# Patient Record
Sex: FEMALE | Race: WHITE | Employment: FULL TIME | URBAN - NONMETROPOLITAN AREA
[De-identification: names, ages, dates, MRNs, and addresses within clinical notes are randomized per-mention and may not be internally consistent; named-entity substitution may affect disease eponyms.]

---

## 2022-11-10 NOTE — PROGRESS NOTES
[facility]   Date Of Service: 11/17/2022  Provider: Afsaneh Delatorre DO, DO  Name: Josselin Bennett   MRN: [id number]    Chief Complaint(s)      Chief Complaint   Patient presents with    New Patient     Aethralgia cephalgia  +YIMI        History of Present illness (HPI)    Josselin Bennett   is a(n)57 y.o. female with a hx of recurrent cold sores, hypothyroidism, tobacco use disorder, depression, referred by Timothy Brice* for evaluation of  arthralgia, cephalgia / headaches (started 2020) , YIMI: 1:320 speckled     Arthralgia of the hand started about 2 years ago (2020) and are now constant. Persistent pain in the knees, and hips for years. Reported intermittent swelling along the thumbs and index finger. Couple week hx of numbness in the left hand with symptoms symptoms. Most severe in the morning. Aching/stiffness Pain up to 7/10 . Aggravating factors:cold, weather changes, use of hands,  hips/knees - stairs. , layingon hip (left)  Alleviating factors: mobic relief (diarrhea) Current therapy:  tylenol (mild rleief) Previous therapy:    Intermittent triggering of the fingers, mainly the 3rd finger. Am stiffness up to 60 minutes. Difficulty with buttons an duse of the hands - most severe in the mornings. -denies Photosenstivity, Rash, dry mouth/dry eyes, oral/nasal sores, Raynaud's, digital ulcerations, skin tightening, renal disease,foamy urination, hematuria, sz's, blood clots,  pre-term labor loss, AIHA,leukpenia/lymphopenia, thrombocytopenia, hair loss, serositis, enthesitis, dactylitis,  hx of STD,  personal or family history of Psoriatic arthritis, psoriasis, ank spond,       Cancer screening: not up to date on pap , up to date on mammogram and c-scope. Review of Systems    Review of Systems   Constitutional:  Positive for fatigue. Eyes: Negative. Respiratory: Negative. Cardiovascular: Negative. Gastrointestinal: Negative.     Genitourinary: Negative. PAST MEDICAL HISTORY     has no past medical history on file. PAST SURGICAL HISTORY     has a past surgical history that includes  section. FAMILY HISTORY      Family History   Problem Relation Age of Onset    Osteoporosis Mother     Arthritis Mother     Hypothyroidism Daughter     Hypothyroidism Daughter        SOCIAL HISTORY     reports that she has been smoking cigarettes. She has a 23.00 pack-year smoking history. She has never used smokeless tobacco. She reports that she does not drink alcohol and does not use drugs. ALLERGIES   No Known Allergies    CURRENT MEDICATIONS      Current Outpatient Medications:     levothyroxine (SYNTHROID) 125 MCG tablet, Take 125 mcg by mouth Daily, Disp: , Rfl:     escitalopram (LEXAPRO) 10 MG tablet, Take 10 mg by mouth daily, Disp: , Rfl:     PHYSICAL EXAMINATION / OBJECTIVE     Objective:  /82 (Site: Left Upper Arm, Position: Sitting, Cuff Size: Medium Adult)   Pulse 59   Ht 5' 4\" (1.626 m)   Wt 188 lb 6.4 oz (85.5 kg)   SpO2 95%   BMI 32.34 kg/m²     Physical Exam  Vitals reviewed. Constitutional:       Appearance: Normal appearance. HENT:      Head: Normocephalic. Nose: Nose normal. No congestion. Mouth/Throat:      Mouth: Mucous membranes are moist.   Eyes:      Comments: Conjunctival injection bilat   Cardiovascular:      Rate and Rhythm: Normal rate. Heart sounds: No murmur heard. Pulmonary:      Effort: Pulmonary effort is normal.      Breath sounds: Normal breath sounds. Musculoskeletal:         General: No swelling or tenderness. Normal range of motion. Skin:     Comments: Nail thickening and onycholysis bilat toes. Neurological:      General: No focal deficit present. Mental Status: She is alert and oriented to person, place, and time. Upper extremities:    Shoulder, elbows, wrists - Non-tender, No swelling   HANDS/FINGERS   Finkelstein test bilat.      CMCs: tender # 1 bilat,  Cms squaring bilat. , cmc grind bilat      MCPs tender # 1 bilat. Lower extremities:  HIPS tender lateral hips at the greater trochanter bilat. FADIR - causing groin pain bilat   KNEES Non-tender, + crepitus   ANKLES tender left    FEET : tender left mid foot and left mtps     Spine:   C-spine, T-spine & L-spine:  tender Right upper trap. , lumbosacral spine. Negative    shober,  pancho,  Occiput to wall , SLR/Cross SLR. LABS        CBC  No results found for: WBC, RBC, HGB, HCT, MCV, MCH, MCHC, RDW, PLT    CMP  Lab Results   Component Value Date/Time    CALCIUM 8.6 03/17/2012 07:00 AM     03/17/2012 07:00 AM    K 4.8 03/17/2012 07:00 AM    CO2 20 03/17/2012 07:00 AM     03/17/2012 07:00 AM    BUN 8 03/17/2012 07:00 AM    CREATININE 0.8 03/17/2012 07:00 AM       HgBA1c: No components found for: HGBA1C    Lab Results   Component Value Date    TSH 1.848 03/17/2012     No results found for: VITD25      No results found for: ANASCRN  No results found for: SSA  No results found for: SSB  No results found for: ANTI-SMITH  No results found for: DSDNAAB   No results found for: ANTIRNP  No results found for: C3, C4  No results found for: CCPAB  No results found for: RF    No components found for: CANCASCRN, APANCASCRN  No results found for: SEDRATE  No results found for: CRP    RADIOLOGY:       ASSESSMENT/PLAN      1. YIMI positive    2. Polyarthralgia    3. Other fatigue    4. Chronic midline low back pain without sciatica    5. Osteoarthritis of both hands, unspecified osteoarthritis type      1. YIMI positive  2. Polyarthralgia   - several year hx of hips , back and knees pain. Worse with stairs, getting up and down from seated position, prolonged inactivity. Alleviating: mobic. 2 year hx of pain mainly in the bilateral 1-2 finger. Worse with use, weather changes, reported swelling along the thumb. + fatigue, exam with cmc squaring, finkelstein test bilat.  + YIMI can be related to hashimoto's thyroiditis. - CBC with Auto Differential; Future  - Comprehensive Metabolic Panel; Future  - Sedimentation Rate; Future  - C-Reactive Protein; Future  - C4 Complement; Future  - C3 Complement; Future  - Protein / creatinine ratio, urine; Future  - Urinalysis; Future  - Miscellaneous Sendout; Future -- avise CTD panel.   - XR HAND RIGHT (MIN 3 VIEWS); Future    3. Other fatigue  -- repeat cbc, cmp, esr, crp , c3, c4, u pr/cr , u/a     4. Chronic midline low back pain without sciatica  - inflammatory and mechanical features. ? Compoenent of DJD of the hips. - XR LUMBAR SPINE (2-3 VIEWS); Future  - XR Hips Bilateral; Future    5. Osteoarthritis of both hands, unspecified osteoarthritis type   - XR HAND RIGHT (MIN 3 VIEWS); Future -  evaluation for secondary causes. - uric acid, iron studies, mag, phos. 6. De Quervain's tenosynovitis: bilat   - + Finkelstein's test and painful resisted abduction of the thumb  - Discussed trial of thumb splinting/wrist splinting initially  - Information provided provided to patient on treatment options and diagnosis  - Other treatment options discussed included hand therapy referral, referral to  orthopedic surgery, and castillo-tendon injections         Return in about 2 months (around 1/17/2023). Electronically signed by Amada Thompson DO on 11/17/2022 at 1:29 PM    New Prescriptions    No medications on file       11/17/2022       The risks and benefits of my recommendations, as well as other treatment options, benefits and side effects were discussed with the patient today. Questions were answered. Thank you for allowing me to participate in the care of this patient. Please call if there are any questions.

## 2022-11-17 ENCOUNTER — NURSE ONLY (OUTPATIENT)
Dept: LAB | Age: 57
End: 2022-11-17

## 2022-11-17 ENCOUNTER — HOSPITAL ENCOUNTER (OUTPATIENT)
Dept: GENERAL RADIOLOGY | Age: 57
Discharge: HOME | End: 2022-11-17
Payer: COMMERCIAL

## 2022-11-17 ENCOUNTER — HOSPITAL ENCOUNTER (OUTPATIENT)
Age: 57
Discharge: HOME | End: 2022-11-17
Payer: COMMERCIAL

## 2022-11-17 ENCOUNTER — OFFICE VISIT (OUTPATIENT)
Dept: RHEUMATOLOGY | Age: 57
End: 2022-11-17
Payer: COMMERCIAL

## 2022-11-17 VITALS
BODY MASS INDEX: 32.17 KG/M2 | OXYGEN SATURATION: 95 % | DIASTOLIC BLOOD PRESSURE: 82 MMHG | SYSTOLIC BLOOD PRESSURE: 116 MMHG | HEART RATE: 59 BPM | HEIGHT: 64 IN | WEIGHT: 188.4 LBS

## 2022-11-17 DIAGNOSIS — M54.50 CHRONIC MIDLINE LOW BACK PAIN WITHOUT SCIATICA: ICD-10-CM

## 2022-11-17 DIAGNOSIS — R76.8 ANA POSITIVE: ICD-10-CM

## 2022-11-17 DIAGNOSIS — M25.50 POLYARTHRALGIA: ICD-10-CM

## 2022-11-17 DIAGNOSIS — M19.041 OSTEOARTHRITIS OF BOTH HANDS, UNSPECIFIED OSTEOARTHRITIS TYPE: ICD-10-CM

## 2022-11-17 DIAGNOSIS — M19.042 OSTEOARTHRITIS OF BOTH HANDS, UNSPECIFIED OSTEOARTHRITIS TYPE: ICD-10-CM

## 2022-11-17 DIAGNOSIS — G89.29 CHRONIC MIDLINE LOW BACK PAIN WITHOUT SCIATICA: ICD-10-CM

## 2022-11-17 DIAGNOSIS — R53.83 OTHER FATIGUE: ICD-10-CM

## 2022-11-17 DIAGNOSIS — R76.8 ANA POSITIVE: Primary | ICD-10-CM

## 2022-11-17 LAB
ALBUMIN SERPL-MCNC: 4.5 G/DL (ref 3.5–5.1)
ALP BLD-CCNC: 70 U/L (ref 38–126)
ALT SERPL-CCNC: 14 U/L (ref 11–66)
ANION GAP SERPL CALCULATED.3IONS-SCNC: 13 MEQ/L (ref 8–16)
AST SERPL-CCNC: 19 U/L (ref 5–40)
BACTERIA: ABNORMAL
BASOPHILS # BLD: 1.4 %
BASOPHILS ABSOLUTE: 0.1 THOU/MM3 (ref 0–0.1)
BILIRUB SERPL-MCNC: 0.4 MG/DL (ref 0.3–1.2)
BILIRUBIN URINE: NEGATIVE
BLOOD, URINE: ABNORMAL
BUN BLDV-MCNC: 11 MG/DL (ref 7–22)
C-REACTIVE PROTEIN: 0.34 MG/DL (ref 0–1)
CALCIUM SERPL-MCNC: 9.6 MG/DL (ref 8.5–10.5)
CASTS: ABNORMAL /LPF
CASTS: ABNORMAL /LPF
CHARACTER, URINE: CLEAR
CHLORIDE BLD-SCNC: 106 MEQ/L (ref 98–111)
CO2: 24 MEQ/L (ref 23–33)
COLOR: YELLOW
CREAT SERPL-MCNC: 0.8 MG/DL (ref 0.4–1.2)
CREATININE URINE: 114.7 MG/DL
CRYSTALS: ABNORMAL
EOSINOPHIL # BLD: 3.6 %
EOSINOPHILS ABSOLUTE: 0.3 THOU/MM3 (ref 0–0.4)
EPITHELIAL CELLS, UA: ABNORMAL /HPF
ERYTHROCYTE [DISTWIDTH] IN BLOOD BY AUTOMATED COUNT: 13.2 % (ref 11.5–14.5)
ERYTHROCYTE [DISTWIDTH] IN BLOOD BY AUTOMATED COUNT: 45.9 FL (ref 35–45)
FERRITIN: 63 NG/ML (ref 10–291)
GFR SERPL CREATININE-BSD FRML MDRD: > 60 ML/MIN/1.73M2
GLUCOSE BLD-MCNC: 85 MG/DL (ref 70–108)
GLUCOSE, URINE: NEGATIVE MG/DL
HCT VFR BLD CALC: 45.6 % (ref 37–47)
HEMOGLOBIN: 15.2 GM/DL (ref 12–16)
IMMATURE GRANS (ABS): 0.02 THOU/MM3 (ref 0–0.07)
IMMATURE GRANULOCYTES: 0.3 %
IRON SATURATION: 36 % (ref 20–50)
IRON: 104 UG/DL (ref 50–170)
KETONES, URINE: ABNORMAL
LEUKOCYTE EST, POC: ABNORMAL
LYMPHOCYTES # BLD: 33.7 %
LYMPHOCYTES ABSOLUTE: 2.7 THOU/MM3 (ref 1–4.8)
MAGNESIUM: 2 MG/DL (ref 1.6–2.4)
MCH RBC QN AUTO: 31.6 PG (ref 26–33)
MCHC RBC AUTO-ENTMCNC: 33.3 GM/DL (ref 32.2–35.5)
MCV RBC AUTO: 94.8 FL (ref 81–99)
MISCELLANEOUS LAB TEST RESULT: ABNORMAL
MONOCYTES # BLD: 5.5 %
MONOCYTES ABSOLUTE: 0.4 THOU/MM3 (ref 0.4–1.3)
NITRITE, URINE: POSITIVE
NUCLEATED RED BLOOD CELLS: 0 /100 WBC
PH UA: 5 (ref 5–9)
PHOSPHORUS: 4 MG/DL (ref 2.4–4.7)
PLATELET # BLD: 352 THOU/MM3 (ref 130–400)
PMV BLD AUTO: 9.6 FL (ref 9.4–12.4)
POTASSIUM SERPL-SCNC: 4 MEQ/L (ref 3.5–5.2)
PROT/CREAT RATIO, UR: 0.14
PROTEIN UA: NEGATIVE MG/DL
PROTEIN, URINE: 15.8 MG/DL
RBC # BLD: 4.81 MILL/MM3 (ref 4.2–5.4)
RBC URINE: ABNORMAL /HPF
RENAL EPITHELIAL, UA: ABNORMAL
SEDIMENTATION RATE, ERYTHROCYTE: 10 MM/HR (ref 0–20)
SEG NEUTROPHILS: 55.5 %
SEGMENTED NEUTROPHILS ABSOLUTE COUNT: 4.4 THOU/MM3 (ref 1.8–7.7)
SODIUM BLD-SCNC: 143 MEQ/L (ref 135–145)
SPECIFIC GRAVITY UA: 1.02 (ref 1–1.03)
TOTAL IRON BINDING CAPACITY: 291 UG/DL (ref 171–450)
TOTAL PROTEIN: 6.9 G/DL (ref 6.1–8)
URIC ACID: 4.8 MG/DL (ref 2.4–5.7)
UROBILINOGEN, URINE: 0.2 EU/DL (ref 0–1)
WBC # BLD: 7.9 THOU/MM3 (ref 4.8–10.8)
WBC UA: ABNORMAL /HPF
YEAST: ABNORMAL

## 2022-11-17 PROCEDURE — 99204 OFFICE O/P NEW MOD 45 MIN: CPT | Performed by: INTERNAL MEDICINE

## 2022-11-17 PROCEDURE — 73521 X-RAY EXAM HIPS BI 2 VIEWS: CPT

## 2022-11-17 PROCEDURE — 73130 X-RAY EXAM OF HAND: CPT

## 2022-11-17 PROCEDURE — 72100 X-RAY EXAM L-S SPINE 2/3 VWS: CPT

## 2022-11-17 RX ORDER — ESCITALOPRAM OXALATE 10 MG/1
10 TABLET ORAL DAILY
COMMUNITY

## 2022-11-17 RX ORDER — CELECOXIB 100 MG/1
100 CAPSULE ORAL DAILY PRN
Qty: 60 CAPSULE | Refills: 3 | Status: SHIPPED | OUTPATIENT
Start: 2022-11-17

## 2022-11-17 RX ORDER — LEVOTHYROXINE SODIUM 0.12 MG/1
125 TABLET ORAL DAILY
COMMUNITY

## 2022-11-17 ASSESSMENT — ENCOUNTER SYMPTOMS
EYES NEGATIVE: 1
GASTROINTESTINAL NEGATIVE: 1
RESPIRATORY NEGATIVE: 1

## 2022-11-18 LAB
C3 COMPLEMENT: 123 MG/DL (ref 90–180)
COMPLEMENT C4: 48 MG/DL (ref 10–40)

## 2022-11-23 NOTE — RESULT ENCOUNTER NOTE
Lab testing revealed a positive antinuclear antibody at 1: 160. There is also positive thyroid antibodies which can result in a positive antinuclear antibody. The testing used to help evaluate for specific inflammatory arthritic conditions were unrevealing. Specific inflammatory conditions evaluated for include rheumatoid arthritis, systemic lupus, Sjogren's syndrome, mixed connective tissue disease and other similar conditions. Given the constellation of symptoms of the chest degenerative changes within the sacroiliac joints if you are willing we could pursue an MRI of the pelvis to evaluate for possible inflammation within the lower back/sacroiliac joints.     Since starting the Celebrex having noted any improvement of the low back pain question

## 2023-01-30 ENCOUNTER — TELEPHONE (OUTPATIENT)
Dept: RHEUMATOLOGY | Age: 58
End: 2023-01-30

## 2023-02-08 ENCOUNTER — OFFICE VISIT (OUTPATIENT)
Dept: RHEUMATOLOGY | Age: 58
End: 2023-02-08
Payer: COMMERCIAL

## 2023-02-08 VITALS
DIASTOLIC BLOOD PRESSURE: 70 MMHG | OXYGEN SATURATION: 99 % | HEIGHT: 64 IN | WEIGHT: 191.4 LBS | SYSTOLIC BLOOD PRESSURE: 116 MMHG | HEART RATE: 66 BPM | BODY MASS INDEX: 32.68 KG/M2

## 2023-02-08 DIAGNOSIS — M54.50 CHRONIC MIDLINE LOW BACK PAIN WITHOUT SCIATICA: ICD-10-CM

## 2023-02-08 DIAGNOSIS — M16.0 PRIMARY OSTEOARTHRITIS OF BOTH HIPS: ICD-10-CM

## 2023-02-08 DIAGNOSIS — M19.049 LOCALIZED OSTEOARTHRITIS OF HAND, UNSPECIFIED LATERALITY: ICD-10-CM

## 2023-02-08 DIAGNOSIS — R76.8 ANA POSITIVE: Primary | ICD-10-CM

## 2023-02-08 DIAGNOSIS — M19.041 OSTEOARTHRITIS OF BOTH HANDS, UNSPECIFIED OSTEOARTHRITIS TYPE: ICD-10-CM

## 2023-02-08 DIAGNOSIS — M19.042 OSTEOARTHRITIS OF BOTH HANDS, UNSPECIFIED OSTEOARTHRITIS TYPE: ICD-10-CM

## 2023-02-08 DIAGNOSIS — G89.29 CHRONIC MIDLINE LOW BACK PAIN WITHOUT SCIATICA: ICD-10-CM

## 2023-02-08 PROCEDURE — 99214 OFFICE O/P EST MOD 30 MIN: CPT | Performed by: INTERNAL MEDICINE

## 2023-02-08 ASSESSMENT — ENCOUNTER SYMPTOMS
EYES NEGATIVE: 1
RESPIRATORY NEGATIVE: 1
GASTROINTESTINAL NEGATIVE: 1

## 2023-02-08 NOTE — PROGRESS NOTES
[facility] RHEUMATOLOGY FOLLOW UP NOTE       Date Of Service: 2/8/2023  Provider: Siomara Tucker DO ,   PCP: Maricarmen Heredia MD   Name: Orlando Wahl   MRN: [id number]        History of Present Illness (HPI)     Chief Complaint   Patient presents with    Follow-up     2 month follow up        Orlando Wahl  is a(n)57 y.o. female with a hx recurrent cold sores, hypothyroidism, tobacco use disorder, depression here for the f/u arthralgia, cephalgia / headaches (started 2020) , YIMI: 1:320 speckled     Hands, hips, lower back, bilateral knees  Pain up to 5/10 most severe in th ehands. .   Most severe in the evenings after work. Weakness of left leg about once per month   Aggravating factors:cold, weather changes, use of hands,  hips/knees - stairs. , laying on hip. Back - standing, walking. Alleviating factors: mobic relief (diarrhea) , sitting/non-wt bearing. Fatigue, non-restorative sleep, + observed snoring   Am stiffness lasting about 30 minutes at it worse. Smoking 1/2 ppd. REVIEW OF SYSTEMS: (ROS)    Review of Systems   Constitutional:  Positive for fatigue. HENT: Negative. Eyes: Negative. Respiratory: Negative. Cardiovascular: Negative. Gastrointestinal: Negative. Endocrine: Negative. Genitourinary: Negative. Skin: Negative. Neurological: Negative. Negative for weakness and numbness. Hematological: Negative. PmHx:  has no past medical history on file. Social History:  reports that she has been smoking cigarettes. She has a 23.00 pack-year smoking history. She has never used smokeless tobacco. She reports that she does not drink alcohol and does not use drugs.     No Known Allergies    CURRENT MEDICATIONS      Current Outpatient Medications:     levothyroxine (SYNTHROID) 125 MCG tablet, Take 125 mcg by mouth Daily, Disp: , Rfl:     escitalopram (LEXAPRO) 10 MG tablet, Take 10 mg by mouth daily, Disp: , Rfl:     celecoxib (CELEBREX) 100 MG capsule, Take 1 capsule by mouth daily as needed for Pain, Disp: 60 capsule, Rfl: 3      PHYSICAL EXAMINATION / OBJECTIVE     Objective:  /70 (Site: Left Upper Arm, Position: Sitting, Cuff Size: Large Adult)   Pulse 66   Ht 5' 4.02\" (1.626 m)   Wt 191 lb 6.4 oz (86.8 kg)   SpO2 99%   BMI 32.84 kg/m²     Physical Exam      General Appearance:  AAO x 3 ,  well-developed and well nourished  Head: NCAT  Eyes: No abnormalities. ,  Sclera non-icteric,   Ears / Nose:  normal  appearance  ears and nose. No active drainage   Mouth:  MMM, ears without deformities  Neck: No jugular venous distention, appears symmetric, good ROM  Lymph:  no adenopathy    Pulmonary/Chest: CTA bilateral ,  symmetric chest expansion. Cardiovascular: Normal S1 and S2, NO murmur, rub, gallop  Neurologic: Speech normal, no facial droop,  Skin: NO rash on exposed skin. Musculoskeletal:    Musculoskeletal:  SHOULDERS - nt  ELBOWS nt  WRISTS nt  HANDS  tender cmc bilateral. + cmc grind (left > right)    HIPS      tender outer hips bilateral.   KNEES   nt  ANKLES nt   FEET :    nt   SPINE:   tender in the lumbosacral spine         Physical Exam    There is currently no information documented on the homunculus.  Go to the Rheumatology activity and complete the homunculus joint exam.    SAL-28 (ESR): --  SAL-28 (CRP): --            LABS      Lab Results   Component Value Date    WBC 7.9 11/17/2022    HGB 15.2 11/17/2022    MCV 94.8 11/17/2022    MCHC 33.3 11/17/2022     11/17/2022    LYMPHSABS 2.7 11/17/2022    EOSABS 0.3 11/17/2022    BASOSABS 0.1 11/17/2022         Chemistry        Component Value Date/Time     11/17/2022 1407    K 4.0 11/17/2022 1407     11/17/2022 1407    CO2 24 11/17/2022 1407    BUN 11 11/17/2022 1407    CREATININE 0.8 11/17/2022 1407        Component Value Date/Time    CALCIUM 9.6 11/17/2022 1407    ALKPHOS 70 11/17/2022 1407    AST 19 11/17/2022 1407    ALT 14 11/17/2022 1407    BILITOT 0.4 11/17/2022 1407            Lab Results   Component Value Date    SEDRATE 10 11/17/2022    CRP 0.34 11/17/2022       No results found for: ANASCRN, SSA, SSB, ANTI-SMITH, DSDNAAB, ANTIRNP    Lab Results   Component Value Date    C3 123 11/17/2022    C4 48 (H) 11/17/2022       No results found for: CCPAB, RF      RADIOLOGY / PROCEDURES:       ASSESSMENT/PLAN:     No diagnosis found. 1. YIMI positive - likely related to thyroid disease.  - negative RAMON ab's. + TPO and TG ab.       2. Polyarthralgia - suspected osteoarthritis  related. (Hands)                - several year hx of hips , back and knees pain. Worse with stairs, getting up and down from seated position, prolonged inactivity. Alleviating: mobic. 2 year hx of pain mainly in the bilateral 1-2 finger. Worse with use, weather changes, reported swelling along the thumb. + fatigue, exam with cmc squaring, finkelstein test bilat.  + YIMI can be related to hashimoto's thyroiditis. 3. Osteoarthritis of both hands - cmc squaring bilat. - negative evaluation for gout and secondary causes of CPPD arthropathy      4. Chronic midline low back pain without sciatica  5. Djd bilateral hips   6. 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 - x-ray  with hip djd and SI joint DJD   - cont. Celebrex one daily prn.     7. Troch bursitis - (suspected) bilateral. - stretches exercises provided. 8. De Quervain's tenosynovitis: right - mild and active. No follow-ups on file. New Prescriptions    No medications on file       2/8/2023    Please contact the office if you have any questions or change of symptoms.